# Patient Record
Sex: FEMALE | Race: WHITE | URBAN - METROPOLITAN AREA
[De-identification: names, ages, dates, MRNs, and addresses within clinical notes are randomized per-mention and may not be internally consistent; named-entity substitution may affect disease eponyms.]

---

## 2021-11-17 ENCOUNTER — IMMUNIZATION (OUTPATIENT)
Dept: LAB | Facility: HOSPITAL | Age: 61
End: 2021-11-17
Attending: EMERGENCY MEDICINE
Payer: COMMERCIAL

## 2021-11-17 DIAGNOSIS — Z23 NEED FOR VACCINATION: Primary | ICD-10-CM

## 2021-11-17 PROCEDURE — 0004A SARSCOV2 VAC 30MCG/0.3ML IM: CPT

## 2022-05-16 ENCOUNTER — TELEPHONE (OUTPATIENT)
Dept: GENERAL RADIOLOGY | Facility: HOSPITAL | Age: 62
End: 2022-05-16

## 2022-05-17 ENCOUNTER — TELEPHONE (OUTPATIENT)
Dept: GENERAL RADIOLOGY | Facility: HOSPITAL | Age: 62
End: 2022-05-17

## 2022-05-17 NOTE — TELEPHONE ENCOUNTER
Alec Reid returned the breast care coordinator's call. Discussed localization procedure to [facility] done [facility]  prior to surgery on Tuesday, May 31. Procedure explained. Ms. Mezany Samanca verbalized understanding and gratitude for the call.

## 2022-05-25 RX ORDER — DIAZEPAM 5 MG/1
5 TABLET ORAL AS NEEDED
Status: CANCELLED | OUTPATIENT
Start: 2022-05-25

## 2022-05-28 ENCOUNTER — LAB ENCOUNTER (OUTPATIENT)
Dept: LAB | Age: 62
End: 2022-05-28
Attending: SURGERY
Payer: COMMERCIAL

## 2022-05-28 DIAGNOSIS — Z01.812 ENCOUNTER FOR PREOPERATIVE SCREENING LABORATORY TESTING FOR COVID-19 VIRUS: ICD-10-CM

## 2022-05-28 DIAGNOSIS — Z20.822 ENCOUNTER FOR PREOPERATIVE SCREENING LABORATORY TESTING FOR COVID-19 VIRUS: ICD-10-CM

## 2022-05-28 LAB — SARS-COV-2 RNA RESP QL NAA+PROBE: NOT DETECTED

## 2022-05-31 ENCOUNTER — HOSPITAL ENCOUNTER (OUTPATIENT)
Facility: HOSPITAL | Age: 62
Setting detail: HOSPITAL OUTPATIENT SURGERY
Discharge: HOME | End: 2022-05-31
Attending: SURGERY | Admitting: SURGERY
Payer: COMMERCIAL

## 2022-05-31 ENCOUNTER — APPOINTMENT (OUTPATIENT)
Dept: MAMMOGRAPHY | Facility: HOSPITAL | Age: 62
End: 2022-05-31
Attending: SURGERY
Payer: COMMERCIAL

## 2022-05-31 ENCOUNTER — HOSPITAL ENCOUNTER (OUTPATIENT)
Dept: MAMMOGRAPHY | Facility: HOSPITAL | Age: 62
Setting detail: HOSPITAL OUTPATIENT SURGERY
Discharge: HOME | End: 2022-05-31
Attending: SURGERY | Admitting: SURGERY
Payer: COMMERCIAL

## 2022-05-31 ENCOUNTER — ANESTHESIA (OUTPATIENT)
Dept: SURGERY | Facility: HOSPITAL | Age: 62
End: 2022-05-31
Payer: COMMERCIAL

## 2022-05-31 ENCOUNTER — ANESTHESIA EVENT (OUTPATIENT)
Dept: SURGERY | Facility: HOSPITAL | Age: 62
End: 2022-05-31
Payer: COMMERCIAL

## 2022-05-31 ENCOUNTER — HOSPITAL ENCOUNTER (OUTPATIENT)
Dept: MAMMOGRAPHY | Facility: HOSPITAL | Age: 62
Discharge: HOME | End: 2022-05-31
Attending: SURGERY
Payer: COMMERCIAL

## 2022-05-31 VITALS
HEART RATE: 57 BPM | WEIGHT: 180.13 LBS | TEMPERATURE: 97 F | SYSTOLIC BLOOD PRESSURE: 143 MMHG | DIASTOLIC BLOOD PRESSURE: 66 MMHG | BODY MASS INDEX: 31.92 KG/M2 | HEIGHT: 63 IN | RESPIRATION RATE: 16 BRPM | OXYGEN SATURATION: 100 %

## 2022-05-31 DIAGNOSIS — Z20.822 ENCOUNTER FOR PREOPERATIVE SCREENING LABORATORY TESTING FOR COVID-19 VIRUS: Primary | ICD-10-CM

## 2022-05-31 DIAGNOSIS — Z01.812 ENCOUNTER FOR PREOPERATIVE SCREENING LABORATORY TESTING FOR COVID-19 VIRUS: Primary | ICD-10-CM

## 2022-05-31 DIAGNOSIS — C50.919 MALIGNANT NEOPLASM OF BREAST (FEMALE) (HCC): ICD-10-CM

## 2022-05-31 PROCEDURE — 77065 DX MAMMO INCL CAD UNI: CPT | Performed by: SURGERY

## 2022-05-31 PROCEDURE — 88342 IMHCHEM/IMCYTCHM 1ST ANTB: CPT | Performed by: SURGERY

## 2022-05-31 PROCEDURE — 10035 PLMT SFT TISS LOCLZJ DEV 1ST: CPT | Performed by: SURGERY

## 2022-05-31 PROCEDURE — 0HBU0ZZ EXCISION OF LEFT BREAST, OPEN APPROACH: ICD-10-PCS | Performed by: SURGERY

## 2022-05-31 PROCEDURE — 76098 X-RAY EXAM SURGICAL SPECIMEN: CPT | Performed by: SURGERY

## 2022-05-31 PROCEDURE — 88307 TISSUE EXAM BY PATHOLOGIST: CPT | Performed by: SURGERY

## 2022-05-31 PROCEDURE — 19285 PERQ DEV BREAST 1ST US IMAG: CPT | Performed by: SURGERY

## 2022-05-31 PROCEDURE — 07T60ZZ RESECTION OF LEFT AXILLARY LYMPHATIC, OPEN APPROACH: ICD-10-PCS | Performed by: SURGERY

## 2022-05-31 RX ORDER — PROCHLORPERAZINE EDISYLATE 5 MG/ML
5 INJECTION INTRAMUSCULAR; INTRAVENOUS EVERY 8 HOURS PRN
Status: DISCONTINUED | OUTPATIENT
Start: 2022-05-31 | End: 2022-05-31 | Stop reason: HOSPADM

## 2022-05-31 RX ORDER — MIDAZOLAM HYDROCHLORIDE 1 MG/ML
INJECTION INTRAMUSCULAR; INTRAVENOUS AS NEEDED
Status: DISCONTINUED | OUTPATIENT
Start: 2022-05-31 | End: 2022-05-31 | Stop reason: SURG

## 2022-05-31 RX ORDER — HYDRALAZINE HYDROCHLORIDE 20 MG/ML
INJECTION INTRAMUSCULAR; INTRAVENOUS
Status: COMPLETED
Start: 2022-05-31 | End: 2022-05-31

## 2022-05-31 RX ORDER — BUPIVACAINE HYDROCHLORIDE 2.5 MG/ML
INJECTION, SOLUTION EPIDURAL; INFILTRATION; INTRACAUDAL AS NEEDED
Status: DISCONTINUED | OUTPATIENT
Start: 2022-05-31 | End: 2022-05-31 | Stop reason: HOSPADM

## 2022-05-31 RX ORDER — DIPHENHYDRAMINE HYDROCHLORIDE 50 MG/ML
12.5 INJECTION INTRAMUSCULAR; INTRAVENOUS AS NEEDED
Status: DISCONTINUED | OUTPATIENT
Start: 2022-05-31 | End: 2022-05-31 | Stop reason: HOSPADM

## 2022-05-31 RX ORDER — DIAZEPAM 5 MG/1
5 TABLET ORAL AS NEEDED
Status: DISCONTINUED | OUTPATIENT
Start: 2022-05-31 | End: 2022-05-31 | Stop reason: HOSPADM

## 2022-05-31 RX ORDER — ONDANSETRON 2 MG/ML
4 INJECTION INTRAMUSCULAR; INTRAVENOUS EVERY 6 HOURS PRN
Status: DISCONTINUED | OUTPATIENT
Start: 2022-05-31 | End: 2022-05-31 | Stop reason: HOSPADM

## 2022-05-31 RX ORDER — GARLIC EXTRACT 500 MG
1 CAPSULE ORAL DAILY
COMMUNITY

## 2022-05-31 RX ORDER — HYDROCODONE BITARTRATE AND ACETAMINOPHEN 5; 325 MG/1; MG/1
2 TABLET ORAL ONCE
Status: COMPLETED | OUTPATIENT
Start: 2022-05-31 | End: 2022-05-31

## 2022-05-31 RX ORDER — HYDROMORPHONE HYDROCHLORIDE 1 MG/ML
0.4 INJECTION, SOLUTION INTRAMUSCULAR; INTRAVENOUS; SUBCUTANEOUS EVERY 5 MIN PRN
Status: DISCONTINUED | OUTPATIENT
Start: 2022-05-31 | End: 2022-05-31 | Stop reason: HOSPADM

## 2022-05-31 RX ORDER — BACTERIOSTATIC SODIUM CHLORIDE 0.9 %
VIAL (ML) INJECTION AS NEEDED
Status: DISCONTINUED | OUTPATIENT
Start: 2022-05-31 | End: 2022-05-31 | Stop reason: HOSPADM

## 2022-05-31 RX ORDER — HYDROCODONE BITARTRATE AND ACETAMINOPHEN 5; 325 MG/1; MG/1
TABLET ORAL
Status: COMPLETED
Start: 2022-05-31 | End: 2022-05-31

## 2022-05-31 RX ORDER — CEFAZOLIN SODIUM/WATER 2 G/20 ML
2 SYRINGE (ML) INTRAVENOUS ONCE
Status: COMPLETED | OUTPATIENT
Start: 2022-05-31 | End: 2022-05-31

## 2022-05-31 RX ORDER — ACETAMINOPHEN 500 MG
1000 TABLET ORAL ONCE AS NEEDED
Status: DISCONTINUED | OUTPATIENT
Start: 2022-05-31 | End: 2022-05-31 | Stop reason: HOSPADM

## 2022-05-31 RX ORDER — HYDROMORPHONE HYDROCHLORIDE 1 MG/ML
0.2 INJECTION, SOLUTION INTRAMUSCULAR; INTRAVENOUS; SUBCUTANEOUS EVERY 5 MIN PRN
Status: DISCONTINUED | OUTPATIENT
Start: 2022-05-31 | End: 2022-05-31 | Stop reason: HOSPADM

## 2022-05-31 RX ORDER — HYDROCODONE BITARTRATE AND ACETAMINOPHEN 5; 325 MG/1; MG/1
2 TABLET ORAL ONCE AS NEEDED
Status: DISCONTINUED | OUTPATIENT
Start: 2022-05-31 | End: 2022-05-31 | Stop reason: HOSPADM

## 2022-05-31 RX ORDER — HYDROCODONE BITARTRATE AND ACETAMINOPHEN 5; 325 MG/1; MG/1
1 TABLET ORAL EVERY 6 HOURS PRN
Qty: 10 TABLET | Refills: 0 | Status: SHIPPED | OUTPATIENT
Start: 2022-05-31

## 2022-05-31 RX ORDER — SODIUM CHLORIDE, SODIUM LACTATE, POTASSIUM CHLORIDE, CALCIUM CHLORIDE 600; 310; 30; 20 MG/100ML; MG/100ML; MG/100ML; MG/100ML
INJECTION, SOLUTION INTRAVENOUS CONTINUOUS
Status: DISCONTINUED | OUTPATIENT
Start: 2022-05-31 | End: 2022-05-31

## 2022-05-31 RX ORDER — LABETALOL HYDROCHLORIDE 5 MG/ML
5 INJECTION, SOLUTION INTRAVENOUS EVERY 5 MIN PRN
Status: DISCONTINUED | OUTPATIENT
Start: 2022-05-31 | End: 2022-05-31 | Stop reason: HOSPADM

## 2022-05-31 RX ORDER — SODIUM CHLORIDE, SODIUM LACTATE, POTASSIUM CHLORIDE, CALCIUM CHLORIDE 600; 310; 30; 20 MG/100ML; MG/100ML; MG/100ML; MG/100ML
INJECTION, SOLUTION INTRAVENOUS CONTINUOUS
Status: DISCONTINUED | OUTPATIENT
Start: 2022-05-31 | End: 2022-05-31 | Stop reason: HOSPADM

## 2022-05-31 RX ORDER — ACETAMINOPHEN 500 MG
1000 TABLET ORAL ONCE
Status: DISCONTINUED | OUTPATIENT
Start: 2022-05-31 | End: 2022-05-31 | Stop reason: HOSPADM

## 2022-05-31 RX ORDER — HEPARIN SODIUM 5000 [USP'U]/ML
5000 INJECTION, SOLUTION INTRAVENOUS; SUBCUTANEOUS ONCE
Status: COMPLETED | OUTPATIENT
Start: 2022-05-31 | End: 2022-05-31

## 2022-05-31 RX ORDER — DEXAMETHASONE SODIUM PHOSPHATE 4 MG/ML
VIAL (ML) INJECTION AS NEEDED
Status: DISCONTINUED | OUTPATIENT
Start: 2022-05-31 | End: 2022-05-31 | Stop reason: SURG

## 2022-05-31 RX ORDER — HYDRALAZINE HYDROCHLORIDE 20 MG/ML
5 INJECTION INTRAMUSCULAR; INTRAVENOUS
Status: DISCONTINUED | OUTPATIENT
Start: 2022-05-31 | End: 2022-05-31 | Stop reason: HOSPADM

## 2022-05-31 RX ORDER — LABETALOL HYDROCHLORIDE 5 MG/ML
INJECTION, SOLUTION INTRAVENOUS AS NEEDED
Status: DISCONTINUED | OUTPATIENT
Start: 2022-05-31 | End: 2022-05-31 | Stop reason: SURG

## 2022-05-31 RX ORDER — SCOLOPAMINE TRANSDERMAL SYSTEM 1 MG/1
1 PATCH, EXTENDED RELEASE TRANSDERMAL ONCE
Status: DISCONTINUED | OUTPATIENT
Start: 2022-05-31 | End: 2022-05-31

## 2022-05-31 RX ORDER — LIDOCAINE HYDROCHLORIDE 10 MG/ML
INJECTION, SOLUTION EPIDURAL; INFILTRATION; INTRACAUDAL; PERINEURAL AS NEEDED
Status: DISCONTINUED | OUTPATIENT
Start: 2022-05-31 | End: 2022-05-31 | Stop reason: SURG

## 2022-05-31 RX ORDER — HYDROMORPHONE HYDROCHLORIDE 1 MG/ML
INJECTION, SOLUTION INTRAMUSCULAR; INTRAVENOUS; SUBCUTANEOUS
Status: COMPLETED
Start: 2022-05-31 | End: 2022-05-31

## 2022-05-31 RX ORDER — NALOXONE HYDROCHLORIDE 0.4 MG/ML
80 INJECTION, SOLUTION INTRAMUSCULAR; INTRAVENOUS; SUBCUTANEOUS AS NEEDED
Status: DISCONTINUED | OUTPATIENT
Start: 2022-05-31 | End: 2022-05-31 | Stop reason: HOSPADM

## 2022-05-31 RX ORDER — HYDROCODONE BITARTRATE AND ACETAMINOPHEN 5; 325 MG/1; MG/1
1 TABLET ORAL ONCE AS NEEDED
Status: DISCONTINUED | OUTPATIENT
Start: 2022-05-31 | End: 2022-05-31 | Stop reason: HOSPADM

## 2022-05-31 RX ORDER — HYDROMORPHONE HYDROCHLORIDE 1 MG/ML
0.6 INJECTION, SOLUTION INTRAMUSCULAR; INTRAVENOUS; SUBCUTANEOUS EVERY 5 MIN PRN
Status: DISCONTINUED | OUTPATIENT
Start: 2022-05-31 | End: 2022-05-31 | Stop reason: HOSPADM

## 2022-05-31 RX ADMIN — LABETALOL HYDROCHLORIDE 2.5 MG: 5 INJECTION, SOLUTION INTRAVENOUS at 16:51:00

## 2022-05-31 RX ADMIN — LIDOCAINE HYDROCHLORIDE 50 MG: 10 INJECTION, SOLUTION EPIDURAL; INFILTRATION; INTRACAUDAL; PERINEURAL at 14:57:00

## 2022-05-31 RX ADMIN — MIDAZOLAM HYDROCHLORIDE 2 MG: 1 INJECTION INTRAMUSCULAR; INTRAVENOUS at 14:55:00

## 2022-05-31 RX ADMIN — SODIUM CHLORIDE, SODIUM LACTATE, POTASSIUM CHLORIDE, CALCIUM CHLORIDE: 600; 310; 30; 20 INJECTION, SOLUTION INTRAVENOUS at 14:52:00

## 2022-05-31 RX ADMIN — LABETALOL HYDROCHLORIDE 2.5 MG: 5 INJECTION, SOLUTION INTRAVENOUS at 17:10:00

## 2022-05-31 RX ADMIN — DEXAMETHASONE SODIUM PHOSPHATE 4 MG: 4 MG/ML VIAL (ML) INJECTION at 14:57:00

## 2022-05-31 RX ADMIN — CEFAZOLIN SODIUM/WATER 2 G: 2 G/20 ML SYRINGE (ML) INTRAVENOUS at 15:12:00

## 2022-05-31 NOTE — OPERATIVE REPORT
Pre-Operative Diagnosis: MALIGNANT NEOPLASM LEFT BREAST FEMALE     Post-Operative Diagnosis: MALIGNANT NEOPLASM LEFT BREAST FEMALE      Procedure Performed:   LEFT BREAST CENTRAL LUMPECTOMY WITH LEFT AXILLARY LYMPH NODE DISSECTION AND LEFT AXILLARY NEEDLE LOCALIZATION    Surgeon(s) and Role:     * Amparo Reynoso MD - Primary    Assistant(s):  PA: EMILY Tran     Assistant was medically necessary for patient positioning, suctioning and retraction of tissues     Surgical Findings: see above     Specimen: to path     Estimated Blood Loss: Blood Output: 30 mL (5/31/2022  5:04 PM)        Eduardo Oquendo MD

## 2022-05-31 NOTE — ANESTHESIA PROCEDURE NOTES
Airway  Date/Time: 5/31/2022 3:05 PM  Urgency: elective      General Information and Staff    Patient location during procedure: OR  Anesthesiologist: Zenia Brantley MD  Performed: anesthesiologist     Indications and Patient Condition  Indications for airway management: anesthesia  Spontaneous Ventilation: absent  Sedation level: deep  Preoxygenated: yes  Patient position: sniffing  Mask difficulty assessment: 1 - vent by mask    Final Airway Details  Final airway type: supraglottic airway      Successful airway: classic  Size 3      Number of attempts at approach: 1

## 2022-05-31 NOTE — BRIEF OP NOTE
Pre-Operative Diagnosis: MALIGNANT NEOPLASM LEFT BREAST FEMALE     Post-Operative Diagnosis: MALIGNANT NEOPLASM LEFT BREAST FEMALE      Procedure Performed:   LEFT BREAST CENTRAL LUMPECTOMY WITH LEFT AXILLARY LYMPH NODE DISSECTION AND LEFT AXILLARY NEEDLE LOCALIZATION    Surgeon(s) and Role:     * Fiona Carrasquillo MD - Primary    Assistant(s):  PA: EMILY Vyas     Assistant was medically necessary for patient positioning, suctioning and retraction of tissues     Surgical Findings: see above     Specimen: to path     Estimated Blood Loss: Blood Output: 30 mL (5/31/2022  5:04 PM)        Morenita Malik MD  5/31/2022  5:19 PM

## 2022-05-31 NOTE — ANESTHESIA POSTPROCEDURE EVALUATION
2220 [facility] Patient Status:  Hospital Outpatient Surgery   Age/Gender 65year old female MRN [id number]   Location 1310 [facility] Attending Julio Monique MD   Hosp Day # 0 PCP Yasmin Elias MD       Anesthesia Post-op Note    LEFT BREAST CENTRAL LUMPECTOMY WITH LEFT AXILLARY LYMPH NODE DISSECTION AND LEFT AXILLARY NEEDLE LOCALIZATION    Procedure Summary     Date: 05/31/22 Room / Location: [address] [place] [address] OR 16 / 1404 [facility] OR    Anesthesia Start: 6086 Anesthesia Stop: 2882    Procedure: LEFT BREAST CENTRAL LUMPECTOMY WITH LEFT AXILLARY LYMPH NODE DISSECTION AND LEFT AXILLARY NEEDLE LOCALIZATION (Left Breast) Diagnosis: (MALIGNANT NEOPLASM LEFT BREAST FEMALE)    Surgeons: Julio Monique MD Anesthesiologist: Johnnie Newby MD    Anesthesia Type: general ASA Status: 2          Anesthesia Type: general    Vitals Value Taken Time   /102 05/31/22 1727   Temp 97 05/31/22 1727   Pulse 55 05/31/22 1727   Resp 19 05/31/22 1727   SpO2 99 05/31/22 1727       Patient Location: PACU    Anesthesia Type: general    Airway Patency: patent    Postop Pain Control: adequate    Mental Status: mildly sedated but able to meaningfully participate in the post-anesthesia evaluation    Nausea/Vomiting: none    Cardiopulmonary/Hydration status: stable euvolemic    Complications: no apparent anesthesia related complications    Postop vital signs: stable    Dental Exam: Unchanged from Preop    Patient to be discharged from PACU when criteria met.

## 2022-05-31 NOTE — IMAGING NOTE
Assisted Dr. Camilla Augustin with needle localization of left breast and left axilla. Procedure explained and all questions answered. Pt verbalized understanding. Emotional support provided and pt tolerated procedure well with minimal discomfort. Wire(s) secured with Tegaderm dressing. Pt transported to OR holding via W/C with wire intact.

## 2022-06-01 NOTE — OPERATIVE REPORT
[facility]    PATIENT'S NAME: Erick Capone   ATTENDING PHYSICIAN: Kristopher Skaggs M.D. OPERATING PHYSICIAN: Kristopher Skaggs M.D. PATIENT ACCOUNT#:   [de-identified]    LOCATION:  [address] [place] [address]  MEDICAL RECORD #:   [id number]       YOB: 1960  ADMISSION DATE:       05/31/2022      OPERATION DATE:  05/31/2022    OPERATIVE REPORT      PREOPERATIVE DIAGNOSIS:  Left breast cancer with metastases to the axillary lymph nodes. POSTOPERATIVE DIAGNOSIS:  Left breast cancer with metastases to the axillary lymph nodes. PROCEDURE:  Left breast central lumpectomy and left axillary lymph node dissection. ASSISTANT:  Kimberly Spain PA-C. ANESTHESIA:  General.    INDICATIONS:  The patient is a 28-year-old lady diagnosed with left breast cancer with metastases to axillary lymph nodes. We discussed the options of breast conservation and mastectomy and pros and cons of each option. She wishes to proceed with breast conservation with removal of the nipple-areolar complex. The surgery was discussed with her in detail as well as potential risks and complications not limited to infection, bleeding, anesthetic, heart attack, stroke, and death. The possible local or distant recurrence was discussed as well as lymphedema. All questions were answered to her satisfaction. She is agreeable to proceed. OPERATIVE TECHNIQUE:  Informed consent was obtained. She was taken to the operating room, SCDs placed, IV antibiotics given, general anesthesia was induced. The left breast and axilla were prepped and draped in the usual sterile fashion. I made an elliptical incision encompassing the nipple-areolar complex and skin on the left side. This included removal of the nipple-areolar complex. Incision was made using scalpel and carried down to breast parenchyma.   Once this was done, I dissected around the mass and in this fashion, specimen was removed in 1 piece and tagged in the following manner:  Long stitch, lateral and short stitch, superficial.  I used Faxitron to x-ray the specimen and the clip and density were noted in there. I examined the lumpectomy cavity. No abnormalities palpated. Hemoclips were placed. The wound was irrigated, suctioned dry, and examined for bleeding. Hemostasis was meticulously maintained using electrocautery. Dermis was reapproximated with interrupted 3-0 Vicryl sutures, skin with 4-0 Vicryl subcuticular stitch, and some nylon sutures were used as well. I then proceeded with the axillary portion of the surgery. The patient was not paralyzed for this portion of the surgery. I made a curvilinear incision in the left axilla. Incision was made with a scalpel and carried down through the clavipectoral fascia. I was able to identify the lymph node that had been biopsied and this was x-rayed. I then carried out the axillary dissection laterally to medially, and long thoracic and thoracodorsal nerves were identified and preserved. Once this was done, these lymph nodes were placed all in a container and marked left axillary contents. I examined the axilla as well as behind the pectoralis major muscle. There was no further lymphadenopathy noted. Some small hemoclips had to be used for hemostasis. I placed a #7 flat JADA drain, and it was cut to an appropriate length so that it was not abutting any structures. It was sutured in place using 3-0 nylon stitch. The wound was irrigated, suctioned dry, and examined for bleeding. Hemostasis was meticulously maintained using electrocautery. Dermis was reapproximated with interrupted 3-0 Vicryl sutures, skin with 4-0 Vicryl subcuticular stitch, and some nylon sutures were used as well. Steri-Strips and light compression dressing were applied. At the end of the procedure, sponge and needle counts were correct. Patient tolerated the procedure well. EBL 30 mL. Complications none.   She was extubated and taken to recovery room in stable condition and awake. I discussed my findings with her  who accompanies her.     Dictated By Tye Harrison M.D.  d: 05/31/2022 17:17:57  t: 05/31/2022 19:29:55  [facility] [id number]  TRAN/

## 2022-06-20 ENCOUNTER — ANESTHESIA EVENT (OUTPATIENT)
Dept: SURGERY | Facility: HOSPITAL | Age: 62
End: 2022-06-20
Payer: COMMERCIAL

## 2022-06-21 ENCOUNTER — ANESTHESIA (OUTPATIENT)
Dept: SURGERY | Facility: HOSPITAL | Age: 62
End: 2022-06-21
Payer: COMMERCIAL

## 2022-06-21 ENCOUNTER — HOSPITAL ENCOUNTER (INPATIENT)
Facility: HOSPITAL | Age: 62
LOS: 2 days | Discharge: HOME HEALTH | End: 2022-06-24
Attending: SURGERY | Admitting: SURGERY
Payer: COMMERCIAL

## 2022-06-21 PROBLEM — C77.9 PRIMARY CANCER OF LEFT BREAST WITH STAGE 2 NODAL METASTASIS PER AMERICAN JOINT COMMITTEE ON CANCER 7TH EDITION (N2) (HCC): Status: ACTIVE | Noted: 2022-06-21

## 2022-06-21 PROBLEM — C50.912 PRIMARY CANCER OF LEFT BREAST WITH STAGE 2 NODAL METASTASIS PER AMERICAN JOINT COMMITTEE ON CANCER 7TH EDITION (N2) (HCC): Status: ACTIVE | Noted: 2022-06-21

## 2022-06-21 LAB
APTT PPP: 31.3 SECONDS (ref 23.3–35.6)
BASOPHILS # BLD AUTO: 0.03 X10(3) UL (ref 0–0.2)
BASOPHILS NFR BLD AUTO: 0.7 %
EOSINOPHIL # BLD AUTO: 0.15 X10(3) UL (ref 0–0.7)
EOSINOPHIL NFR BLD AUTO: 3.5 %
ERYTHROCYTE [DISTWIDTH] IN BLOOD BY AUTOMATED COUNT: 14 %
HCT VFR BLD AUTO: 33.7 %
HGB BLD-MCNC: 10.8 G/DL
IMM GRANULOCYTES # BLD AUTO: 0.01 X10(3) UL (ref 0–1)
IMM GRANULOCYTES NFR BLD: 0.2 %
INR BLD: 1.04 (ref 0.8–1.2)
LYMPHOCYTES # BLD AUTO: 1.54 X10(3) UL (ref 1–4)
LYMPHOCYTES NFR BLD AUTO: 35.8 %
MCH RBC QN AUTO: 29.7 PG (ref 26–34)
MCHC RBC AUTO-ENTMCNC: 32 G/DL (ref 31–37)
MCV RBC AUTO: 92.6 FL
MONOCYTES # BLD AUTO: 0.61 X10(3) UL (ref 0.1–1)
MONOCYTES NFR BLD AUTO: 14.2 %
NEUTROPHILS # BLD AUTO: 1.96 X10 (3) UL (ref 1.5–7.7)
NEUTROPHILS # BLD AUTO: 1.96 X10(3) UL (ref 1.5–7.7)
NEUTROPHILS NFR BLD AUTO: 45.6 %
PLATELET # BLD AUTO: 349 10(3)UL (ref 150–450)
PROTHROMBIN TIME: 13.7 SECONDS (ref 11.6–14.8)
RBC # BLD AUTO: 3.64 X10(6)UL
SARS-COV-2 RNA RESP QL NAA+PROBE: NOT DETECTED
WBC # BLD AUTO: 4.3 X10(3) UL (ref 4–11)

## 2022-06-21 PROCEDURE — 87075 CULTR BACTERIA EXCEPT BLOOD: CPT | Performed by: SURGERY

## 2022-06-21 PROCEDURE — 87070 CULTURE OTHR SPECIMN AEROBIC: CPT | Performed by: SURGERY

## 2022-06-21 PROCEDURE — 85610 PROTHROMBIN TIME: CPT | Performed by: SURGERY

## 2022-06-21 PROCEDURE — 87077 CULTURE AEROBIC IDENTIFY: CPT | Performed by: SURGERY

## 2022-06-21 PROCEDURE — 87205 SMEAR GRAM STAIN: CPT | Performed by: SURGERY

## 2022-06-21 PROCEDURE — 87186 SC STD MICRODIL/AGAR DIL: CPT | Performed by: SURGERY

## 2022-06-21 PROCEDURE — 0JCF0ZZ EXTIRPATION OF MATTER FROM LEFT UPPER ARM SUBCUTANEOUS TISSUE AND FASCIA, OPEN APPROACH: ICD-10-PCS | Performed by: SURGERY

## 2022-06-21 PROCEDURE — 87176 TISSUE HOMOGENIZATION CULTR: CPT | Performed by: SURGERY

## 2022-06-21 PROCEDURE — 85730 THROMBOPLASTIN TIME PARTIAL: CPT | Performed by: SURGERY

## 2022-06-21 PROCEDURE — 85025 COMPLETE CBC W/AUTO DIFF WBC: CPT | Performed by: SURGERY

## 2022-06-21 RX ORDER — CEFAZOLIN SODIUM/WATER 2 G/20 ML
2 SYRINGE (ML) INTRAVENOUS ONCE
Status: COMPLETED | OUTPATIENT
Start: 2022-06-21 | End: 2022-06-21

## 2022-06-21 RX ORDER — ACETAMINOPHEN 325 MG/1
650 TABLET ORAL EVERY 8 HOURS PRN
Status: DISCONTINUED | OUTPATIENT
Start: 2022-06-21 | End: 2022-06-24

## 2022-06-21 RX ORDER — CEFAZOLIN SODIUM/WATER 2 G/20 ML
2 SYRINGE (ML) INTRAVENOUS EVERY 8 HOURS
Status: COMPLETED | OUTPATIENT
Start: 2022-06-22 | End: 2022-06-22

## 2022-06-21 RX ORDER — HYDROMORPHONE HYDROCHLORIDE 1 MG/ML
0.6 INJECTION, SOLUTION INTRAMUSCULAR; INTRAVENOUS; SUBCUTANEOUS EVERY 5 MIN PRN
Status: DISCONTINUED | OUTPATIENT
Start: 2022-06-21 | End: 2022-06-21 | Stop reason: HOSPADM

## 2022-06-21 RX ORDER — HYDROMORPHONE HYDROCHLORIDE 1 MG/ML
0.2 INJECTION, SOLUTION INTRAMUSCULAR; INTRAVENOUS; SUBCUTANEOUS EVERY 5 MIN PRN
Status: DISCONTINUED | OUTPATIENT
Start: 2022-06-21 | End: 2022-06-21 | Stop reason: HOSPADM

## 2022-06-21 RX ORDER — HYDROMORPHONE HYDROCHLORIDE 1 MG/ML
0.4 INJECTION, SOLUTION INTRAMUSCULAR; INTRAVENOUS; SUBCUTANEOUS EVERY 5 MIN PRN
Status: DISCONTINUED | OUTPATIENT
Start: 2022-06-21 | End: 2022-06-21 | Stop reason: HOSPADM

## 2022-06-21 RX ORDER — ONDANSETRON 2 MG/ML
4 INJECTION INTRAMUSCULAR; INTRAVENOUS EVERY 6 HOURS PRN
Status: DISCONTINUED | OUTPATIENT
Start: 2022-06-21 | End: 2022-06-24

## 2022-06-21 RX ORDER — HYDROCODONE BITARTRATE AND ACETAMINOPHEN 5; 325 MG/1; MG/1
1 TABLET ORAL EVERY 6 HOURS PRN
Status: DISCONTINUED | OUTPATIENT
Start: 2022-06-21 | End: 2022-06-22

## 2022-06-21 RX ORDER — ONDANSETRON 2 MG/ML
INJECTION INTRAMUSCULAR; INTRAVENOUS AS NEEDED
Status: DISCONTINUED | OUTPATIENT
Start: 2022-06-21 | End: 2022-06-21 | Stop reason: SURG

## 2022-06-21 RX ORDER — SODIUM CHLORIDE, SODIUM LACTATE, POTASSIUM CHLORIDE, CALCIUM CHLORIDE 600; 310; 30; 20 MG/100ML; MG/100ML; MG/100ML; MG/100ML
INJECTION, SOLUTION INTRAVENOUS CONTINUOUS
Status: DISCONTINUED | OUTPATIENT
Start: 2022-06-21 | End: 2022-06-24

## 2022-06-21 RX ORDER — LIDOCAINE HYDROCHLORIDE 10 MG/ML
INJECTION, SOLUTION EPIDURAL; INFILTRATION; INTRACAUDAL; PERINEURAL AS NEEDED
Status: DISCONTINUED | OUTPATIENT
Start: 2022-06-21 | End: 2022-06-21 | Stop reason: SURG

## 2022-06-21 RX ORDER — SCOLOPAMINE TRANSDERMAL SYSTEM 1 MG/1
1 PATCH, EXTENDED RELEASE TRANSDERMAL ONCE
Status: DISCONTINUED | OUTPATIENT
Start: 2022-06-21 | End: 2022-06-24

## 2022-06-21 RX ORDER — ACETAMINOPHEN 500 MG
1000 TABLET ORAL ONCE
Status: DISCONTINUED | OUTPATIENT
Start: 2022-06-21 | End: 2022-06-21 | Stop reason: HOSPADM

## 2022-06-21 RX ORDER — HEPARIN SODIUM 5000 [USP'U]/ML
5000 INJECTION, SOLUTION INTRAVENOUS; SUBCUTANEOUS ONCE
Status: COMPLETED | OUTPATIENT
Start: 2022-06-21 | End: 2022-06-21

## 2022-06-21 RX ORDER — DEXAMETHASONE SODIUM PHOSPHATE 4 MG/ML
VIAL (ML) INJECTION AS NEEDED
Status: DISCONTINUED | OUTPATIENT
Start: 2022-06-21 | End: 2022-06-21 | Stop reason: SURG

## 2022-06-21 RX ORDER — SODIUM CHLORIDE 9 MG/ML
INJECTION, SOLUTION INTRAVENOUS CONTINUOUS
Status: DISCONTINUED | OUTPATIENT
Start: 2022-06-21 | End: 2022-06-24

## 2022-06-21 RX ORDER — NALOXONE HYDROCHLORIDE 0.4 MG/ML
80 INJECTION, SOLUTION INTRAMUSCULAR; INTRAVENOUS; SUBCUTANEOUS AS NEEDED
Status: DISCONTINUED | OUTPATIENT
Start: 2022-06-21 | End: 2022-06-21 | Stop reason: HOSPADM

## 2022-06-21 RX ORDER — SODIUM CHLORIDE, SODIUM LACTATE, POTASSIUM CHLORIDE, CALCIUM CHLORIDE 600; 310; 30; 20 MG/100ML; MG/100ML; MG/100ML; MG/100ML
INJECTION, SOLUTION INTRAVENOUS CONTINUOUS
Status: DISCONTINUED | OUTPATIENT
Start: 2022-06-21 | End: 2022-06-21 | Stop reason: HOSPADM

## 2022-06-21 RX ADMIN — SODIUM CHLORIDE, SODIUM LACTATE, POTASSIUM CHLORIDE, CALCIUM CHLORIDE: 600; 310; 30; 20 INJECTION, SOLUTION INTRAVENOUS at 18:12:00

## 2022-06-21 RX ADMIN — CEFAZOLIN SODIUM/WATER 2 G: 2 G/20 ML SYRINGE (ML) INTRAVENOUS at 17:05:00

## 2022-06-21 RX ADMIN — ONDANSETRON 4 MG: 2 INJECTION INTRAMUSCULAR; INTRAVENOUS at 18:12:00

## 2022-06-21 RX ADMIN — LIDOCAINE HYDROCHLORIDE 100 MG: 10 INJECTION, SOLUTION EPIDURAL; INFILTRATION; INTRACAUDAL; PERINEURAL at 17:03:00

## 2022-06-21 RX ADMIN — SODIUM CHLORIDE, SODIUM LACTATE, POTASSIUM CHLORIDE, CALCIUM CHLORIDE: 600; 310; 30; 20 INJECTION, SOLUTION INTRAVENOUS at 18:11:00

## 2022-06-21 RX ADMIN — SODIUM CHLORIDE, SODIUM LACTATE, POTASSIUM CHLORIDE, CALCIUM CHLORIDE: 600; 310; 30; 20 INJECTION, SOLUTION INTRAVENOUS at 17:16:00

## 2022-06-21 RX ADMIN — SODIUM CHLORIDE, SODIUM LACTATE, POTASSIUM CHLORIDE, CALCIUM CHLORIDE: 600; 310; 30; 20 INJECTION, SOLUTION INTRAVENOUS at 16:56:00

## 2022-06-21 RX ADMIN — DEXAMETHASONE SODIUM PHOSPHATE 4 MG: 4 MG/ML VIAL (ML) INJECTION at 17:06:00

## 2022-06-21 NOTE — ANESTHESIA POSTPROCEDURE EVALUATION
2220 [facility] Patient Status:  Hospital Outpatient Surgery   Age/Gender 65year old female MRN [id number]   [facility] SURGERY Attending Emilio Jimenes MD   Hosp Day # 0 PCP Eunice Miller MD       Anesthesia Post-op Note    EVACUATION OF HEMATOMA LEFT AXILLA    Procedure Summary     Date: 06/21/22 Room / Location: [address] [place] [address] OR 05 / 1404 [facility] OR    Anesthesia Start: 0893 Anesthesia Stop: 5399    Procedure: EVACUATION OF HEMATOMA LEFT AXILLA (Left Axilla) Diagnosis: (HEMATOMA LEFT AXILLA)    Surgeons: Emilio Jimenes MD Anesthesiologist: London King MD    Anesthesia Type: general ASA Status: 2          Anesthesia Type: general    Vitals Value Taken Time   /80 06/21/22 1825   Temp 97.8 06/21/22 1825   Pulse 74 06/21/22 1825   Resp 16 06/21/22 1825   SpO2 97 06/21/22 1825       Patient Location: PACU    Anesthesia Type: general    Airway Patency: patent    Postop Pain Control: adequate    Mental Status: preanesthetic baseline    Nausea/Vomiting: none    Cardiopulmonary/Hydration status: stable euvolemic    Complications: no apparent anesthesia related complications    Postop vital signs: stable    Dental Exam: Unchanged from Preop    Patient to be discharged from PACU when criteria met.

## 2022-06-21 NOTE — BRIEF OP NOTE
Pre-Operative Diagnosis: HEMATOMA LEFT AXILLA     Post-Operative Diagnosis: HEMATOMA LEFT AXILLA      Procedure Performed:   EVACUATION OF HEMATOMA LEFT AXILLA    Surgeon(s) and Role:     * Romina Mcgraw MD - Primary    Assistant(s):  Surgical Assistant.: Lamnie Fitzpatrick CSA     Surgical Findings:  Infected left axillary hematoma     Specimen: gram stain and culture sent     Estimated Blood Loss: Blood Output: 20 mL (6/21/2022  5:56 PM)      Shan Gibson MD  6/21/2022  6:40 PM

## 2022-06-21 NOTE — OPERATIVE REPORT
Pre-Operative Diagnosis: HEMATOMA LEFT AXILLA     Post-Operative Diagnosis: HEMATOMA LEFT AXILLA      Procedure Performed:   EVACUATION OF HEMATOMA LEFT AXILLA    Surgeon(s) and Role:     * Adilia Anderson MD - Primary    Assistant(s):  Surgical Assistant.: Michelle Harper CSA     Surgical Findings:  Infected left axillary hematoma     Specimen: gram stain and culture sent     Estimated Blood Loss: Blood Output: 20 mL (6/21/2022  5:56 PM)      Yolanda Mendoza MD

## 2022-06-21 NOTE — ANESTHESIA PROCEDURE NOTES
Airway  Date/Time: 6/21/2022 5:03 PM  Urgency: elective    Airway not difficult    General Information and Staff    Patient location during procedure: OR  Anesthesiologist: Alphonso Deleon MD  Performed: anesthesiologist     Indications and Patient Condition  Indications for airway management: anesthesia  Sedation level: deep  Preoxygenated: yes  Patient position: sniffing  Mask difficulty assessment: 1 - vent by mask    Final Airway Details  Final airway type: supraglottic airway      Successful airway: classic  Size 3      Number of attempts at approach: 1

## 2022-06-22 PROBLEM — Z01.818 PRE-OP TESTING: Status: ACTIVE | Noted: 2022-06-22

## 2022-06-22 RX ORDER — CEFAZOLIN SODIUM/WATER 2 G/20 ML
2 SYRINGE (ML) INTRAVENOUS EVERY 8 HOURS
Status: DISCONTINUED | OUTPATIENT
Start: 2022-06-22 | End: 2022-06-22

## 2022-06-22 RX ORDER — HYDROCODONE BITARTRATE AND ACETAMINOPHEN 5; 325 MG/1; MG/1
1 TABLET ORAL EVERY 4 HOURS PRN
Status: DISCONTINUED | OUTPATIENT
Start: 2022-06-22 | End: 2022-06-24

## 2022-06-22 NOTE — HOME CARE LIAISON
Received referral via [facility] for Home Health services. Spoke w/ patient and provided with list of [facility] providers from [place], choice is [facility]. Agency reserved in [place] and contact information placed on AVS.Financial interest disclosure provided.  Notified Toribio Burkitt

## 2022-06-22 NOTE — OPERATIVE REPORT
[facility]    PATIENT'S NAME: Liu Silveira   ATTENDING PHYSICIAN: Corinne Senna, M.D. OPERATING PHYSICIAN: Corinne Senna, M.D. PATIENT ACCOUNT#:   [de-identified]    LOCATION:  [address] [place] [address]  MEDICAL RECORD #:   [id number]       YOB: 1960  ADMISSION DATE:       06/21/2022      OPERATION DATE:  06/21/2022    OPERATIVE REPORT      PREOPERATIVE DIAGNOSIS:  Left axillary hematoma with possible infection  POSTOPERATIVE DIAGNOSIS:  Left axillary hematoma with infection  PROCEDURE:  Evacuation of left axillary hematoma and irrigation    ASSISTANT:  Nasreen Adame CSA     ANESTHESIA:  General.      INDICATIONS:  The patient is a 58-year-old lady who has left breast cancer. She underwent left breast conservation and axillary dissection. She developed a hematoma postoperatively in the axilla, which subsequently started to drain, and she presents for evacuation of the hematoma. The surgery discussed with her and her  in detail, as well as potential risks and complications, not limited to infection, bleeding, anesthetic risk, heart attack, stroke, and death. I had explained to them preop that we may have to leave the wound open. They are very agreeable with this. They are also aware that it will take some weeks to heal.     OPERATIVE TECHNIQUE:  Informed consent was obtained. She was taken to the operating room, SCDs placed, IV antibiotics given, general anesthesia induced. The left breast and axilla were prepped and draped in the usual sterile fashion. I opened up the sutures in the left axilla and entered the axillary cavity. The fluid was slightly purulent. This was sent for Gram stain and culture. I evacuated the hematoma. This wound was irrigated copiously with normal saline. Hemostasis was maintained using electrocautery, and some Surgicel had to be used as well. Once this was done, there was no further bleeding noted.   The wound was packed with saline-soaked Kerlix, and dry dressings were applied. At the end of the procedure, the sponge and needle counts were correct. Patient tolerated the procedure well. EBL 20 mL. Complication none. She was taken to recovery room in stable condition, awake. I discussed my findings with her  who accompanies her.     Dictated By Jer Howard M.D.  d: 06/21/2022 18:40:25  t: 06/21/2022 21:08:41  Job [id number]  XA/

## 2022-06-22 NOTE — PLAN OF CARE
Problem: Patient/Family Goals  Goal: Patient/Family Long Term Goal  Description: Patient's Long Term Goal: discharge home    Interventions:  - wound care consult  - manage pain  - ambulate  - See additional Care Plan goals for specific interventions  Outcome: Progressing  Goal: Patient/Family Short Term Goal  Description: Patient's Short Term Goal: comfort    Interventions:   - prn pain meds  - prn antiemetics  - repositioning  - See additional Care Plan goals for specific interventions  Outcome: Progressing     Problem: PAIN - ADULT  Goal: Verbalizes/displays adequate comfort level or patient's stated pain goal  Description: INTERVENTIONS:  - Encourage pt to monitor pain and request assistance  - Assess pain using appropriate pain scale  - Administer analgesics based on type and severity of pain and evaluate response  - Implement non-pharmacological measures as appropriate and evaluate response  - Consider cultural and social influences on pain and pain management  - Manage/alleviate anxiety  - Utilize distraction and/or relaxation techniques  - Monitor for opioid side effects  - Notify MD/LIP if interventions unsuccessful or patient reports new pain  - Anticipate increased pain with activity and pre-medicate as appropriate  Outcome: Progressing     Problem: RISK FOR INFECTION - ADULT  Goal: Absence of fever/infection during anticipated neutropenic period  Description: INTERVENTIONS  - Monitor WBC  - Administer growth factors as ordered  - Implement neutropenic guidelines  Outcome: Progressing     Problem: SAFETY ADULT - FALL  Goal: Free from fall injury  Description: INTERVENTIONS:  - Assess pt frequently for physical needs  - Identify cognitive and physical deficits and behaviors that affect risk of falls.   - [place] fall precautions as indicated by assessment.  - Educate pt/family on patient safety including physical limitations  - Instruct pt to call for assistance with activity based on assessment  - Modify environment to reduce risk of injury  - Provide assistive devices as appropriate  - Consider OT/PT consult to assist with strengthening/mobility  - Encourage toileting schedule  Outcome: Progressing     Problem: DISCHARGE PLANNING  Goal: Discharge to home or other facility with appropriate resources  Description: INTERVENTIONS:  - Identify barriers to discharge w/pt and caregiver  - Include patient/family/discharge partner in discharge planning  - Arrange for needed discharge resources and transportation as appropriate  - Identify discharge learning needs (meds, wound care, etc)  - Arrange for interpreters to assist at discharge as needed  - Consider post-discharge preferences of patient/family/discharge partner  - Complete POLST form as appropriate  - Assess patient's ability to be responsible for managing their own health  - Refer to Case Management Department for coordinating discharge planning if the patient needs post-hospital services based on physician/LIP order or complex needs related to functional status, cognitive ability or social support system  Outcome: Progressing

## 2022-06-22 NOTE — CM/SW NOTE
CHARLENE Consult Order received for Discharge Planning; Wound Vac needed for patient discharge home. CM completed [employer] Order Form and faxed signed order with clinical documentation to [address] at [phone]. CM also called Robert Norwood at [phone] for update, message left for call back. CM to follow up to confirm order was received and processed. CHARLENE updated on dc plan. Update:  1330: CM received message from Ambar Barahona at [facility]; wound vac order and documentation were received and order is being processed.       Aissatou Stafford, RN Case Manager [id number]

## 2022-06-22 NOTE — PROGRESS NOTES
NURSING ADMISSION NOTE      Patient admitted via Cart. Oriented to room. Safety precautions initiated. Bed in low position. Call light in reach. Pt arrived to unit from PACU. A&O x4, VSS, afebrile. RA, . Pt to start regular diet, denies n/v. Voids. Up with SBA, steady gait. IV fluids infusing. Incision to L axilla with kerlix and surgical bra in place. L arm precautions maintained. Database complete. Pt's  at bedside. Will continue to monitor.

## 2022-06-22 NOTE — PLAN OF CARE
Problem: Patient/Family Goals  Goal: Patient/Family Long Term Goal  Description: Patient's Long Term Goal: discharge home    Interventions:  - wound care consult  - manage pain  - ambulate  - See additional Care Plan goals for specific interventions  Outcome: Progressing  Goal: Patient/Family Short Term Goal  Description: Patient's Short Term Goal: comfort    Interventions:   - prn pain meds  - prn antiemetics  - repositioning  - See additional Care Plan goals for specific interventions  Outcome: Progressing     Problem: PAIN - ADULT  Goal: Verbalizes/displays adequate comfort level or patient's stated pain goal  Description: INTERVENTIONS:  - Encourage pt to monitor pain and request assistance  - Assess pain using appropriate pain scale  - Administer analgesics based on type and severity of pain and evaluate response  - Implement non-pharmacological measures as appropriate and evaluate response  - Consider cultural and social influences on pain and pain management  - Manage/alleviate anxiety  - Utilize distraction and/or relaxation techniques  - Monitor for opioid side effects  - Notify MD/LIP if interventions unsuccessful or patient reports new pain  - Anticipate increased pain with activity and pre-medicate as appropriate  Outcome: Progressing     Problem: RISK FOR INFECTION - ADULT  Goal: Absence of fever/infection during anticipated neutropenic period  Description: INTERVENTIONS  - Monitor WBC  - Administer growth factors as ordered  - Implement neutropenic guidelines  Outcome: Progressing     Problem: SAFETY ADULT - FALL  Goal: Free from fall injury  Description: INTERVENTIONS:  - Assess pt frequently for physical needs  - Identify cognitive and physical deficits and behaviors that affect risk of falls.   - [place] fall precautions as indicated by assessment.  - Educate pt/family on patient safety including physical limitations  - Instruct pt to call for assistance with activity based on assessment  - Modify environment to reduce risk of injury  - Provide assistive devices as appropriate  - Consider OT/PT consult to assist with strengthening/mobility  - Encourage toileting schedule  Outcome: Progressing     Problem: DISCHARGE PLANNING  Goal: Discharge to home or other facility with appropriate resources  Description: INTERVENTIONS:  - Identify barriers to discharge w/pt and caregiver  - Include patient/family/discharge partner in discharge planning  - Arrange for needed discharge resources and transportation as appropriate  - Identify discharge learning needs (meds, wound care, etc)  - Arrange for interpreters to assist at discharge as needed  - Consider post-discharge preferences of patient/family/discharge partner  - Complete POLST form as appropriate  - Assess patient's ability to be responsible for managing their own health  - Refer to Case Management Department for coordinating discharge planning if the patient needs post-hospital services based on physician/LIP order or complex needs related to functional status, cognitive ability or social support system  Outcome: Progressing

## 2022-06-23 LAB — CREAT BLD-MCNC: 0.83 MG/DL

## 2022-06-23 PROCEDURE — 99214 OFFICE O/P EST MOD 30 MIN: CPT

## 2022-06-23 PROCEDURE — 97605 NEG PRS WND THER DME<=50SQCM: CPT

## 2022-06-23 PROCEDURE — 82565 ASSAY OF CREATININE: CPT | Performed by: SURGERY

## 2022-06-23 RX ORDER — LEVOFLOXACIN 750 MG/1
750 TABLET ORAL DAILY
Qty: 10 TABLET | Refills: 0 | Status: SHIPPED | OUTPATIENT
Start: 2022-06-23 | End: 2022-07-03

## 2022-06-23 RX ORDER — LEVOFLOXACIN 750 MG/1
750 TABLET ORAL DAILY
Status: DISCONTINUED | OUTPATIENT
Start: 2022-06-23 | End: 2022-06-24

## 2022-06-23 NOTE — PLAN OF CARE
Pt a&ox4, afebrile. Tolerating diet without nausea. On room air. Voiding freely. Pain managed with prn norco, see mar. Ambulates independently with steady gait. Dressing to left axilla c/d/i, pt wearing surgical bra. Plan for wound care to apply wound vac @ 10 AM tomorrow. Pt aware and updated on plan of care. @ 2000: Dr. Lucero Escobar paged to notify of wound cultures. See orders to consult ID. ID notified of new consult, antibiotics changed to zosyn. Pt updated on plan of care.       Problem: Patient/Family Goals  Goal: Patient/Family Long Term Goal  Description: Patient's Long Term Goal: discharge home    Interventions:  - wound care consult  - manage pain  - ambulate  - See additional Care Plan goals for specific interventions  Outcome: Progressing  Goal: Patient/Family Short Term Goal  Description: Patient's Short Term Goal: comfort    Interventions:   - prn pain meds  - prn antiemetics  - repositioning  - See additional Care Plan goals for specific interventions  Outcome: Progressing     Problem: PAIN - ADULT  Goal: Verbalizes/displays adequate comfort level or patient's stated pain goal  Description: INTERVENTIONS:  - Encourage pt to monitor pain and request assistance  - Assess pain using appropriate pain scale  - Administer analgesics based on type and severity of pain and evaluate response  - Implement non-pharmacological measures as appropriate and evaluate response  - Consider cultural and social influences on pain and pain management  - Manage/alleviate anxiety  - Utilize distraction and/or relaxation techniques  - Monitor for opioid side effects  - Notify MD/LIP if interventions unsuccessful or patient reports new pain  - Anticipate increased pain with activity and pre-medicate as appropriate  Outcome: Progressing     Problem: RISK FOR INFECTION - ADULT  Goal: Absence of fever/infection during anticipated neutropenic period  Description: INTERVENTIONS  - Monitor WBC  - Administer growth factors as ordered  - Implement neutropenic guidelines  Outcome: Progressing     Problem: SAFETY ADULT - FALL  Goal: Free from fall injury  Description: INTERVENTIONS:  - Assess pt frequently for physical needs  - Identify cognitive and physical deficits and behaviors that affect risk of falls.   - [place] fall precautions as indicated by assessment.  - Educate pt/family on patient safety including physical limitations  - Instruct pt to call for assistance with activity based on assessment  - Modify environment to reduce risk of injury  - Provide assistive devices as appropriate  - Consider OT/PT consult to assist with strengthening/mobility  - Encourage toileting schedule  Outcome: Progressing     Problem: DISCHARGE PLANNING  Goal: Discharge to home or other facility with appropriate resources  Description: INTERVENTIONS:  - Identify barriers to discharge w/pt and caregiver  - Include patient/family/discharge partner in discharge planning  - Arrange for needed discharge resources and transportation as appropriate  - Identify discharge learning needs (meds, wound care, etc)  - Arrange for interpreters to assist at discharge as needed  - Consider post-discharge preferences of patient/family/discharge partner  - Complete POLST form as appropriate  - Assess patient's ability to be responsible for managing their own health  - Refer to Case Management Department for coordinating discharge planning if the patient needs post-hospital services based on physician/LIP order or complex needs related to functional status, cognitive ability or social support system  Outcome: Progressing

## 2022-06-23 NOTE — PLAN OF CARE
Problem: Patient/Family Goals  Goal: Patient/Family Long Term Goal  Description: Patient's Long Term Goal: discharge home    Interventions:  - wound care consult  - manage pain  - ambulate  - See additional Care Plan goals for specific interventions  Outcome: Progressing  Goal: Patient/Family Short Term Goal  Description: Patient's Short Term Goal: comfort    Interventions:   - prn pain meds  - prn antiemetics  - repositioning  - See additional Care Plan goals for specific interventions  Outcome: Progressing     Problem: PAIN - ADULT  Goal: Verbalizes/displays adequate comfort level or patient's stated pain goal  Description: INTERVENTIONS:  - Encourage pt to monitor pain and request assistance  - Assess pain using appropriate pain scale  - Administer analgesics based on type and severity of pain and evaluate response  - Implement non-pharmacological measures as appropriate and evaluate response  - Consider cultural and social influences on pain and pain management  - Manage/alleviate anxiety  - Utilize distraction and/or relaxation techniques  - Monitor for opioid side effects  - Notify MD/LIP if interventions unsuccessful or patient reports new pain  - Anticipate increased pain with activity and pre-medicate as appropriate  Outcome: Progressing     Problem: RISK FOR INFECTION - ADULT  Goal: Absence of fever/infection during anticipated neutropenic period  Description: INTERVENTIONS  - Monitor WBC  - Administer growth factors as ordered  - Implement neutropenic guidelines  Outcome: Progressing     Problem: SAFETY ADULT - FALL  Goal: Free from fall injury  Description: INTERVENTIONS:  - Assess pt frequently for physical needs  - Identify cognitive and physical deficits and behaviors that affect risk of falls.   - [place] fall precautions as indicated by assessment.  - Educate pt/family on patient safety including physical limitations  - Instruct pt to call for assistance with activity based on assessment  - Modify environment to reduce risk of injury  - Provide assistive devices as appropriate  - Consider OT/PT consult to assist with strengthening/mobility  - Encourage toileting schedule  Outcome: Progressing     Problem: DISCHARGE PLANNING  Goal: Discharge to home or other facility with appropriate resources  Description: INTERVENTIONS:  - Identify barriers to discharge w/pt and caregiver  - Include patient/family/discharge partner in discharge planning  - Arrange for needed discharge resources and transportation as appropriate  - Identify discharge learning needs (meds, wound care, etc)  - Arrange for interpreters to assist at discharge as needed  - Consider post-discharge preferences of patient/family/discharge partner  - Complete POLST form as appropriate  - Assess patient's ability to be responsible for managing their own health  - Refer to Case Management Department for coordinating discharge planning if the patient needs post-hospital services based on physician/LIP order or complex needs related to functional status, cognitive ability or social support system  Outcome: Progressing     Pt. Up in chair. Left axilla dressing and surgical bra in place. Pt. Pre-medicated at 0912 with one norco tab for scheduled 1000 wound vac placement.

## 2022-06-24 VITALS
TEMPERATURE: 98 F | BODY MASS INDEX: 31.89 KG/M2 | OXYGEN SATURATION: 96 % | HEART RATE: 71 BPM | SYSTOLIC BLOOD PRESSURE: 134 MMHG | DIASTOLIC BLOOD PRESSURE: 66 MMHG | RESPIRATION RATE: 18 BRPM | HEIGHT: 63 IN | WEIGHT: 180 LBS

## 2022-06-24 NOTE — PLAN OF CARE
Pt a&ox4, afebrile. Voiding freely. Pain managed with prn norco.  Wound vac to left axilla, also wearing surgical bra. Ambulates with steady gait. Pt updated on plan of care and verbalizes understanding.       Problem: Patient/Family Goals  Goal: Patient/Family Long Term Goal  Description: Patient's Long Term Goal: discharge home    Interventions:  - wound care consult  - manage pain  - ambulate  - See additional Care Plan goals for specific interventions  Outcome: Progressing  Goal: Patient/Family Short Term Goal  Description: Patient's Short Term Goal: comfort    Interventions:   - prn pain meds  - prn antiemetics  - repositioning  - See additional Care Plan goals for specific interventions  Outcome: Progressing     Problem: PAIN - ADULT  Goal: Verbalizes/displays adequate comfort level or patient's stated pain goal  Description: INTERVENTIONS:  - Encourage pt to monitor pain and request assistance  - Assess pain using appropriate pain scale  - Administer analgesics based on type and severity of pain and evaluate response  - Implement non-pharmacological measures as appropriate and evaluate response  - Consider cultural and social influences on pain and pain management  - Manage/alleviate anxiety  - Utilize distraction and/or relaxation techniques  - Monitor for opioid side effects  - Notify MD/LIP if interventions unsuccessful or patient reports new pain  - Anticipate increased pain with activity and pre-medicate as appropriate  Outcome: Progressing     Problem: RISK FOR INFECTION - ADULT  Goal: Absence of fever/infection during anticipated neutropenic period  Description: INTERVENTIONS  - Monitor WBC  - Administer growth factors as ordered  - Implement neutropenic guidelines  Outcome: Progressing     Problem: SAFETY ADULT - FALL  Goal: Free from fall injury  Description: INTERVENTIONS:  - Assess pt frequently for physical needs  - Identify cognitive and physical deficits and behaviors that affect risk of falls.  - [place] fall precautions as indicated by assessment.  - Educate pt/family on patient safety including physical limitations  - Instruct pt to call for assistance with activity based on assessment  - Modify environment to reduce risk of injury  - Provide assistive devices as appropriate  - Consider OT/PT consult to assist with strengthening/mobility  - Encourage toileting schedule  Outcome: Progressing     Problem: DISCHARGE PLANNING  Goal: Discharge to home or other facility with appropriate resources  Description: INTERVENTIONS:  - Identify barriers to discharge w/pt and caregiver  - Include patient/family/discharge partner in discharge planning  - Arrange for needed discharge resources and transportation as appropriate  - Identify discharge learning needs (meds, wound care, etc)  - Arrange for interpreters to assist at discharge as needed  - Consider post-discharge preferences of patient/family/discharge partner  - Complete POLST form as appropriate  - Assess patient's ability to be responsible for managing their own health  - Refer to Case Management Department for coordinating discharge planning if the patient needs post-hospital services based on physician/LIP order or complex needs related to functional status, cognitive ability or social support system  Outcome: Progressing

## 2022-06-24 NOTE — DISCHARGE SUMMARY
[facility]  Discharge Summary    Flora Granado Patient Status:  Inpatient    1960 MRN [id number]   [facility] 3NW-A Attending Julio Monique MD   Hosp Day # 2 PCP Yasmin Elias MD     Date of Admission: 2022    Date of Discharge: 2022    Admitting Diagnosis: HEMATOMA LEFT AXILLA  Primary cancer of left breast with stage 2 yuan metastasis per American Joint Committee on Cancer 7th edition (N2) [facility])  Pre-op testing    Discharge Diagnosis: Patient Active Problem List:     Primary cancer of left breast with stage 2 yuan metastasis per American Joint Committee on Cancer 7th edition (N2) ([facility] [place] [address]     Pre-op testing      Procedures: EVACUATION OF HEMATOMA LEFT AXILLA            Hospital Course:  Pt was brought to the operating room and underwent EVACUATION OF HEMATOMA LEFT AXILLA, without incident. Pt was admitted to floor following procedure for further evaluation and management. Pt progressed well following surgery with an uneventful hospital course. Once tolerating a diet and pain adequately controlled with PO rx, pt was deemed ready for discharge. Disposition: Home or Self Care    Discharge Condition: Stable    Discharge Medications: Current Discharge Medication List    START taking these medications    levoFLOXacin 750 MG Oral Tab  Take 1 tablet (750 mg total) by mouth daily for 10 days. Qty: 10 tablet Refills: 0      CONTINUE these medications which have NOT CHANGED    dicloxacillin 500 MG Oral Cap  Take 500 mg by mouth 4 (four) times daily. HYDROcodone-acetaminophen 5-325 MG Oral Tab  Take 1 tablet by mouth every 6 (six) hours as needed for Pain. Qty: 10 tablet Refills: 0    Acidophilus/Pectin Oral Cap  Take 1 capsule by mouth daily. Follow up Visits: Follow-up with Dr. Taz Waters in 10 days.             Emmanuel Granger  Surgery  2022

## 2022-06-24 NOTE — PLAN OF CARE
Patient is alert and oriented. On room air. Wound vac removed. Applied wet to dry using kerlix roll and placed ABD pad. Patient tolerated dressing change well. Norco given before dressing change. Problem: Patient/Family Goals  Goal: Patient/Family Long Term Goal  Description: Patient's Long Term Goal: discharge home    Interventions:  - wound care consult  - manage pain  - ambulate  - See additional Care Plan goals for specific interventions  Outcome: Progressing  Goal: Patient/Family Short Term Goal  Description: Patient's Short Term Goal: comfort    Interventions:   - prn pain meds  - prn antiemetics  - repositioning  - See additional Care Plan goals for specific interventions  Outcome: Progressing     Problem: PAIN - ADULT  Goal: Verbalizes/displays adequate comfort level or patient's stated pain goal  Description: INTERVENTIONS:  - Encourage pt to monitor pain and request assistance  - Assess pain using appropriate pain scale  - Administer analgesics based on type and severity of pain and evaluate response  - Implement non-pharmacological measures as appropriate and evaluate response  - Consider cultural and social influences on pain and pain management  - Manage/alleviate anxiety  - Utilize distraction and/or relaxation techniques  - Monitor for opioid side effects  - Notify MD/LIP if interventions unsuccessful or patient reports new pain  - Anticipate increased pain with activity and pre-medicate as appropriate  Outcome: Progressing     Problem: RISK FOR INFECTION - ADULT  Goal: Absence of fever/infection during anticipated neutropenic period  Description: INTERVENTIONS  - Monitor WBC  - Administer growth factors as ordered  - Implement neutropenic guidelines  Outcome: Progressing     Problem: SAFETY ADULT - FALL  Goal: Free from fall injury  Description: INTERVENTIONS:  - Assess pt frequently for physical needs  - Identify cognitive and physical deficits and behaviors that affect risk of falls.   - [place] fall precautions as indicated by assessment.  - Educate pt/family on patient safety including physical limitations  - Instruct pt to call for assistance with activity based on assessment  - Modify environment to reduce risk of injury  - Provide assistive devices as appropriate  - Consider OT/PT consult to assist with strengthening/mobility  - Encourage toileting schedule  Outcome: Progressing     Problem: DISCHARGE PLANNING  Goal: Discharge to home or other facility with appropriate resources  Description: INTERVENTIONS:  - Identify barriers to discharge w/pt and caregiver  - Include patient/family/discharge partner in discharge planning  - Arrange for needed discharge resources and transportation as appropriate  - Identify discharge learning needs (meds, wound care, etc)  - Arrange for interpreters to assist at discharge as needed  - Consider post-discharge preferences of patient/family/discharge partner  - Complete POLST form as appropriate  - Assess patient's ability to be responsible for managing their own health  - Refer to Case Management Department for coordinating discharge planning if the patient needs post-hospital services based on physician/LIP order or complex needs related to functional status, cognitive ability or social support system  Outcome: Progressing

## 2022-06-24 NOTE — PROGRESS NOTES
NURSING DISCHARGE NOTE    Discharged Home via Wheelchair. Accompanied by Spouse  Belongings Taken by patient/family. IV taken out. Discharge instructions given to patient. Patient verbalized understanding.

## 2022-06-24 NOTE — CONSULTS
[facility]  Inpatient Wound Care Contact Note    Eduardo Rosa Patient Status:  Inpatient    1960 MRN [id number]   [facility] 3NW-A Attending Bharti Miramontes MD   Hosp Day # 2 PCP Myriam Barker MD     Negative pressure wound therapy  was removed  by the nurse and applied wet to dry dressing on the surgical site prior to discharge. Will continue negative pressure wound therapy at home as ordered by . This writer tried to call 's office earlier this afternoon  and left message to clarify  the use of negative pressure wound therapy on the patient's wound due to hx of malignancy on the affected site. SALVADOR Sanches returned the call and explained that patient had a \"recent surgery[ dissection of the lymph nodes] and its alright to apply the NPWT in the wound per . \"      Thank you,    Baltazar Dixon RN BSN [address]  [address]ella [address]  [place], [address] [place] [address] Rd  [phone]  Spectralink: [phone]

## 2022-06-25 NOTE — PAYOR COMM NOTE
--------------  DISCHARGE REVIEW    Payor: [facility]  Subscriber #:  [id number]  Authorization Number: [id number]    Admit date: 6/22/22  Admit time:   2:45 PM  Discharge Date: 6/24/2022  2:35 PM     Admitting Physician: Madison Leo MD  Attending Physician:  No att. providers found  Primary Care Physician: Asha Jenkins MD       Discharge Summary Notes    No notes of this type exist for this encounter.          REVIEWER COMMENTS

## 2022-07-08 ENCOUNTER — OFFICE VISIT (OUTPATIENT)
Dept: WOUND CARE | Facility: HOSPITAL | Age: 62
End: 2022-07-08
Attending: NURSE PRACTITIONER
Payer: COMMERCIAL

## 2022-07-08 VITALS
HEIGHT: 63 IN | BODY MASS INDEX: 31.89 KG/M2 | TEMPERATURE: 98 F | DIASTOLIC BLOOD PRESSURE: 82 MMHG | WEIGHT: 180 LBS | HEART RATE: 80 BPM | RESPIRATION RATE: 18 BRPM | SYSTOLIC BLOOD PRESSURE: 121 MMHG

## 2022-07-08 DIAGNOSIS — A49.8 INFECTION CAUSED BY ENTEROBACTER CLOACAE: ICD-10-CM

## 2022-07-08 DIAGNOSIS — C77.9 PRIMARY CANCER OF LEFT BREAST WITH STAGE 2 NODAL METASTASIS PER AMERICAN JOINT COMMITTEE ON CANCER 7TH EDITION (N2) (HCC): Primary | ICD-10-CM

## 2022-07-08 DIAGNOSIS — C50.912 PRIMARY CANCER OF LEFT BREAST WITH STAGE 2 NODAL METASTASIS PER AMERICAN JOINT COMMITTEE ON CANCER 7TH EDITION (N2) (HCC): Primary | ICD-10-CM

## 2022-07-08 DIAGNOSIS — T81.31XA DISRUPTION OF EXTERNAL OPERATION (SURGICAL) WOUND, NOT ELSEWHERE CLASSIFIED, INITIAL ENCOUNTER: ICD-10-CM

## 2022-07-08 PROCEDURE — 97605 NEG PRS WND THER DME<=50SQCM: CPT

## 2022-07-08 PROCEDURE — 99214 OFFICE O/P EST MOD 30 MIN: CPT

## 2022-07-08 NOTE — PROGRESS NOTES
.Weekly Wound Education Note    Teaching Provided To: Patient; Family  Training Topics: Negative presssure therapy; Discharge instructions;Dressing  Training Method: Explain/Verbal;Written  Training Response: Patient responds and understands         Continue NPWT, Kerry Ag added to base of wound. Skin prep, Miconazole powder crust to lilli area.

## 2022-07-15 ENCOUNTER — OFFICE VISIT (OUTPATIENT)
Dept: WOUND CARE | Facility: HOSPITAL | Age: 62
End: 2022-07-15
Attending: NURSE PRACTITIONER
Payer: COMMERCIAL

## 2022-07-15 VITALS
HEART RATE: 90 BPM | TEMPERATURE: 98 F | SYSTOLIC BLOOD PRESSURE: 147 MMHG | RESPIRATION RATE: 16 BRPM | DIASTOLIC BLOOD PRESSURE: 88 MMHG

## 2022-07-15 DIAGNOSIS — T81.31XA DISRUPTION OF EXTERNAL OPERATION (SURGICAL) WOUND, NOT ELSEWHERE CLASSIFIED, INITIAL ENCOUNTER: ICD-10-CM

## 2022-07-15 DIAGNOSIS — C50.912 PRIMARY CANCER OF LEFT BREAST WITH STAGE 2 NODAL METASTASIS PER AMERICAN JOINT COMMITTEE ON CANCER 7TH EDITION (N2) (HCC): Primary | ICD-10-CM

## 2022-07-15 DIAGNOSIS — C77.9 PRIMARY CANCER OF LEFT BREAST WITH STAGE 2 NODAL METASTASIS PER AMERICAN JOINT COMMITTEE ON CANCER 7TH EDITION (N2) (HCC): Primary | ICD-10-CM

## 2022-07-15 DIAGNOSIS — A49.8 INFECTION CAUSED BY ENTEROBACTER CLOACAE: ICD-10-CM

## 2022-07-15 PROCEDURE — 99215 OFFICE O/P EST HI 40 MIN: CPT | Performed by: NURSE PRACTITIONER

## 2022-07-15 RX ORDER — GENTAMICIN SULFATE 1 MG/G
1 OINTMENT TOPICAL 2 TIMES DAILY
Qty: 45 G | Refills: 0 | Status: SHIPPED | OUTPATIENT
Start: 2022-07-15 | End: 2022-08-14

## 2022-07-15 NOTE — PROGRESS NOTES
.Weekly Wound Education Note    Teaching Provided To: Patient; Family  Training Topics: Discharge instructions;Dressing;Negative presssure therapy;Cleasing and general instructions  Training Method: Explain/Verbal;Written  Training Response: Patient responds and understands; Reinforcement needed         Betamethasone, Miconazole to lilli area. Gentamicin ointment to wound twice daily, pack with gauze. NPWT on hold x2 weeks.

## 2022-07-28 ENCOUNTER — OFFICE VISIT (OUTPATIENT)
Dept: WOUND CARE | Facility: HOSPITAL | Age: 62
End: 2022-07-28
Attending: FAMILY MEDICINE
Payer: COMMERCIAL

## 2022-07-28 VITALS
RESPIRATION RATE: 18 BRPM | DIASTOLIC BLOOD PRESSURE: 86 MMHG | TEMPERATURE: 98 F | HEART RATE: 89 BPM | SYSTOLIC BLOOD PRESSURE: 140 MMHG

## 2022-07-28 DIAGNOSIS — S41.102D OPEN WOUND OF LEFT AXILLARY REGION, SUBSEQUENT ENCOUNTER: ICD-10-CM

## 2022-07-28 DIAGNOSIS — T81.31XA DISRUPTION OF EXTERNAL OPERATION (SURGICAL) WOUND, NOT ELSEWHERE CLASSIFIED, INITIAL ENCOUNTER: Primary | ICD-10-CM

## 2022-07-28 PROCEDURE — 99214 OFFICE O/P EST MOD 30 MIN: CPT | Performed by: FAMILY MEDICINE

## 2022-07-28 NOTE — PROGRESS NOTES
Weekly Wound Education Note    Teaching Provided To: Patient  Training Topics: Dressing;Cleasing and general instructions; Discharge instructions  Training Method: Demonstration;Explain/Verbal  Training Response: Patient responds and understands        Notes: Wound improving, kimberlee ag and hydrofera ready to wound.

## 2022-08-03 ENCOUNTER — TELEPHONE (OUTPATIENT)
Dept: WOUND CARE | Facility: HOSPITAL | Age: 62
End: 2022-08-03

## 2022-08-03 NOTE — TELEPHONE ENCOUNTER
Patient called today to cancel her appointment on August 10 th at 8:00. Patient was seen by her surgeon said wound is healing well.

## 2022-08-10 ENCOUNTER — APPOINTMENT (OUTPATIENT)
Dept: WOUND CARE | Facility: HOSPITAL | Age: 62
End: 2022-08-10
Attending: NURSE PRACTITIONER
Payer: COMMERCIAL

## 2022-08-11 ENCOUNTER — APPOINTMENT (OUTPATIENT)
Dept: WOUND CARE | Facility: HOSPITAL | Age: 62
End: 2022-08-11
Attending: NURSE PRACTITIONER
Payer: COMMERCIAL

## 2023-07-20 RX ORDER — AMOXICILLIN 250 MG
1 CAPSULE ORAL
COMMUNITY

## 2023-07-20 RX ORDER — ANASTROZOLE 1 MG/1
1 TABLET ORAL DAILY
COMMUNITY

## 2023-08-07 ENCOUNTER — ANESTHESIA EVENT (OUTPATIENT)
Dept: ENDOSCOPY | Facility: HOSPITAL | Age: 63
End: 2023-08-07
Payer: COMMERCIAL

## 2023-08-07 ENCOUNTER — ANESTHESIA (OUTPATIENT)
Dept: ENDOSCOPY | Facility: HOSPITAL | Age: 63
End: 2023-08-07
Payer: COMMERCIAL

## 2023-08-07 ENCOUNTER — HOSPITAL ENCOUNTER (OUTPATIENT)
Facility: HOSPITAL | Age: 63
Setting detail: HOSPITAL OUTPATIENT SURGERY
Discharge: HOME | End: 2023-08-07
Attending: INTERNAL MEDICINE | Admitting: INTERNAL MEDICINE
Payer: COMMERCIAL

## 2023-08-07 VITALS
WEIGHT: 146 LBS | DIASTOLIC BLOOD PRESSURE: 82 MMHG | HEART RATE: 58 BPM | TEMPERATURE: 98 F | HEIGHT: 63 IN | RESPIRATION RATE: 14 BRPM | OXYGEN SATURATION: 100 % | SYSTOLIC BLOOD PRESSURE: 114 MMHG | BODY MASS INDEX: 25.87 KG/M2

## 2023-08-07 PROCEDURE — 88305 TISSUE EXAM BY PATHOLOGIST: CPT | Performed by: INTERNAL MEDICINE

## 2023-08-07 PROCEDURE — 0DBH8ZZ EXCISION OF CECUM, VIA NATURAL OR ARTIFICIAL OPENING ENDOSCOPIC: ICD-10-PCS | Performed by: INTERNAL MEDICINE

## 2023-08-07 RX ORDER — LIDOCAINE HYDROCHLORIDE 10 MG/ML
INJECTION, SOLUTION EPIDURAL; INFILTRATION; INTRACAUDAL; PERINEURAL AS NEEDED
Status: DISCONTINUED | OUTPATIENT
Start: 2023-08-07 | End: 2023-08-07 | Stop reason: SURG

## 2023-08-07 RX ORDER — SODIUM CHLORIDE, SODIUM LACTATE, POTASSIUM CHLORIDE, CALCIUM CHLORIDE 600; 310; 30; 20 MG/100ML; MG/100ML; MG/100ML; MG/100ML
INJECTION, SOLUTION INTRAVENOUS CONTINUOUS
Status: DISCONTINUED | OUTPATIENT
Start: 2023-08-07 | End: 2023-08-07

## 2023-08-07 RX ADMIN — LIDOCAINE HYDROCHLORIDE 50 MG: 10 INJECTION, SOLUTION EPIDURAL; INFILTRATION; INTRACAUDAL; PERINEURAL at 09:12:00

## 2023-08-07 NOTE — OPERATIVE REPORT
[facility] OPERATIVE REPORT   PATIENT NAME: Misty Benavidez  MRN: [id number]  DATE OF OPERATION: 8/7/2023  PREOPERATIVE DIAGNOSIS: history of lilli-appendiceal  polyp- sessile serrated adenoma  POSTOPERATIVE DIAGNOSES   8mm sessile lilli-appendiceal orifice polyp - s/p lifting with hybrid APC to separate polyp from appendiceal orifice and successful removal with snare polypectomy  Tortuous colon  PROCEDURE PERFORMED: Colonoscopy to cecum with distal attachment cap and with endoscopic mucosal resection (EMR)  SCOPE UTILIZED: Pediatric Olympus Colonoscope  SEDATION MEDICATIONS: MAC   CECAL WITHDRAWAL TIME= 10 mins  DURATION of CONSCIOUS SEDATION: deep sedation provided by anesthesiologist  PREPROCEDURE ASSESSMENT: The indication for this procedure is to assess for polyps. The patient was identified by myself and nursing staff in the exam room. Informed consent was obtained. The patient was seen in clinic and a full H&P was obtained. On brief physical examination, airway is patent. Chest is clear. Heart has regular rate and rhythm. Abdomen is soft, nontender with good bowel sounds. A medication list was taken by nursing today and reviewed by myself. The patient is an ASA grade 2. Due to the technical nature of the procedure, pathology of the anal area could be missed. PROCEDURE NOTE: The procedure was completed without difficulty. The patient tolerated the procedure well. The prep was good. The colonoscope was inserted through the anus and advanced to the level of the cecum with visualization and photo documentation of the appendix. A slow withdrawal of the colonoscope was performed as well as retroflexion in the rectum. A subtle 8 mm polyp was seen next to the appendiceal orifice. This polyp was raised with submucosal injection of hetastarch, epinephrine and methylene blue using hybrid APC jet to get it away from the orifice. Once raised, the edges were much better delineated.  It was removed with hot and cold snare,  The edges of the polypectomy site were fulgurated to prevent local recurrence using soft coag and snare tip. No other polyps, masses or lesions were found throughout the colon. Small internal hemorrhoids were noted. There were no immediate complications. FINDINGS   Successful removal of lilli-appendiceal orifice polyp  RECOMMENDATIONS: Repeat colonoscopy in 3 years with Dr. Renate Elizabeth: The patient was given an after visit summary detailing the procedure, findings, recommendations, f/u plan and an updated medication list.   PREP Quality indicators:  Aronchick scale    EXCELLENT - small volume of clear liquid > 95% of mucosa see  GOOD  - clear liquid covering up to 25% of mucosa, but > 90% of mucosa seen  FAIR  - some semisolid stool could be suctioned but > 90% of mucosa seen  POOR  - semisolid stool could not be suctioned and < 90% of mucosal seen  INADEQUATE- repeat preparation needed      Thank you very much for the consultation. I really appreciate it.     Luli Fowler MD

## 2023-08-07 NOTE — DISCHARGE INSTRUCTIONS

## (undated) DEVICE — PENCIL TELESCOPE MEGADYNE SE

## (undated) DEVICE — 3M™ RED DOT™ MONITORING ELECTRODE WITH FOAM TAPE AND STICKY GEL, 50/BAG, 20/CASE, 72/PLT 2570: Brand: RED DOT™

## (undated) DEVICE — SLEEVE KENDALL SCD EXPRESS MED

## (undated) DEVICE — CAP SEALING, REVEAL 13.4MM

## (undated) DEVICE — KIT ENDO ORCAPOD 160/180/190

## (undated) DEVICE — SUT ETHILON 3-0 PS-2 1669H

## (undated) DEVICE — ABSORBABLE HEMOSTAT (OXIDIZED REGENERATED CELLULOSE, U.S.P.): Brand: SURGICEL

## (undated) DEVICE — APPLICATOR CHLORAPREP 10.5ML

## (undated) DEVICE — SUPER SPONGES,MEDIUM: Brand: KERLIX

## (undated) DEVICE — BREAST-HERNIA-PORT CDS-LF: Brand: MEDLINE INDUSTRIES, INC.

## (undated) DEVICE — STERILE POLYISOPRENE POWDER-FREE SURGICAL GLOVES: Brand: PROTEXIS

## (undated) DEVICE — SUT VICRYL 3-0 SH J416H

## (undated) DEVICE — HEMOCLIP HORIZON SM 001200

## (undated) DEVICE — LASSO POLYPECTOMY SNARE: Brand: LASSO

## (undated) DEVICE — REDUCER FITTING (NON-STERILE) 7/8 IN (22 MM) TO 1/4 IN (6.4 MM): Brand: CONMED BUFFALO FILTER

## (undated) DEVICE — PLASTIC BREAST CDS-LF: Brand: MEDLINE INDUSTRIES, INC.

## (undated) DEVICE — VIOLET BRAIDED (POLYGLACTIN 910), SYNTHETIC ABSORBABLE SUTURE: Brand: COATED VICRYL

## (undated) DEVICE — 3M™ STERI-STRIP™ REINFORCED ADHESIVE SKIN CLOSURES, R1547, 1/2 IN X 4 IN (12 MM X 100 MM), 6 STRIPS/ENVELOPE: Brand: 3M™ STERI-STRIP™

## (undated) DEVICE — PUMP CARTRIDGE FOR ERBEJET® 2: Brand: ERBE

## (undated) DEVICE — TIP BOVIE 4" MEGADYNE

## (undated) DEVICE — LAPAROTOMY SPONGE - RF AND X-RAY DETECTABLE PRE-WASHED: Brand: SITUATE

## (undated) DEVICE — DRAIN SILICONE FLAT 7X20

## (undated) DEVICE — PAD SACRAL PREMIUM 12X12X1

## (undated) DEVICE — SHEET,DRAPE,40X58,STERILE: Brand: MEDLINE

## (undated) DEVICE — SOLUTION  .9 1000ML BTL

## (undated) DEVICE — EVACUATOR URO RELIVAC 100CC

## (undated) DEVICE — SUT ETHILON 5-0 PS-3 1668H

## (undated) DEVICE — 10FT COMBINED O2 DELIVERY/CO2 MONITORING. FILTER WITH MICROSTREAM TYPE LUER: Brand: DUAL ADULT NASAL CANNULA

## (undated) DEVICE — TRAP SPEC REMOVAL ETRAP 15CM

## (undated) DEVICE — UNDYED BRAIDED (POLYGLACTIN 910), SYNTHETIC ABSORBABLE SUTURE: Brand: COATED VICRYL

## (undated) DEVICE — REM POLYHESIVE ADULT PATIENT RETURN ELECTRODE: Brand: VALLEYLAB

## (undated) DEVICE — LIGHT HANDLE

## (undated) DEVICE — 3M™ TEGADERM™ TRANSPARENT FILM DRESSING, 1626W, 4 IN X 4-3/4 IN (10 CM X 12 CM), 50 EACH/CARTON, 4 CARTON/CASE: Brand: 3M™ TEGADERM™

## (undated) DEVICE — BRA SURGICAL ELIZABETH PINK L

## (undated) DEVICE — BIOGUARD CLEANING ADAPTER

## (undated) DEVICE — UNDERPAD 23X36 LIGHT CHUX

## (undated) DEVICE — HYBRIDAPC PROBE OD 2.3MM; L 1.9M: Brand: ERBE

## (undated) DEVICE — GLOVE SURG SENSICARE SZ 7

## (undated) DEVICE — SPONGE RAYTEC 4X4 RF DETECT

## (undated) DEVICE — ENDOSCOPY PACK - LOWER: Brand: MEDLINE INDUSTRIES, INC.

## (undated) DEVICE — 1200CC GUARDIAN II: Brand: GUARDIAN

## (undated) DEVICE — CHLORAPREP 26ML APPLICATOR

## (undated) DEVICE — HEMOCLIP HORIZON MED 002200